# Patient Record
Sex: FEMALE | Race: BLACK OR AFRICAN AMERICAN | NOT HISPANIC OR LATINO | Employment: FULL TIME | URBAN - METROPOLITAN AREA
[De-identification: names, ages, dates, MRNs, and addresses within clinical notes are randomized per-mention and may not be internally consistent; named-entity substitution may affect disease eponyms.]

---

## 2017-08-17 ENCOUNTER — OFFICE VISIT (OUTPATIENT)
Dept: GASTROENTEROLOGY | Facility: CLINIC | Age: 47
End: 2017-08-17

## 2017-08-17 VITALS
DIASTOLIC BLOOD PRESSURE: 78 MMHG | WEIGHT: 154.6 LBS | SYSTOLIC BLOOD PRESSURE: 122 MMHG | HEIGHT: 63 IN | TEMPERATURE: 98.2 F | BODY MASS INDEX: 27.39 KG/M2

## 2017-08-17 DIAGNOSIS — K21.9 GASTROESOPHAGEAL REFLUX DISEASE, ESOPHAGITIS PRESENCE NOT SPECIFIED: Primary | ICD-10-CM

## 2017-08-17 DIAGNOSIS — R09.89 GLOBUS SENSATION: ICD-10-CM

## 2017-08-17 PROCEDURE — 99204 OFFICE O/P NEW MOD 45 MIN: CPT | Performed by: INTERNAL MEDICINE

## 2017-08-17 RX ORDER — OMEPRAZOLE 40 MG/1
40 CAPSULE, DELAYED RELEASE ORAL DAILY
COMMUNITY
End: 2017-08-17 | Stop reason: ALTCHOICE

## 2017-08-17 RX ORDER — SODIUM CHLORIDE 0.9 % (FLUSH) 0.9 %
1-10 SYRINGE (ML) INJECTION AS NEEDED
Status: CANCELLED | OUTPATIENT
Start: 2017-09-19

## 2017-08-17 RX ORDER — ESOMEPRAZOLE MAGNESIUM 40 MG/1
40 CAPSULE, DELAYED RELEASE ORAL DAILY
Qty: 30 CAPSULE | Refills: 3 | Status: SHIPPED | OUTPATIENT
Start: 2017-08-17 | End: 2017-09-21 | Stop reason: SDUPTHER

## 2017-08-17 RX ORDER — SUCRALFATE ORAL 1 G/10ML
1 SUSPENSION ORAL EVERY 6 HOURS PRN
Qty: 420 ML | Refills: 2 | Status: SHIPPED | OUTPATIENT
Start: 2017-08-17 | End: 2018-07-16

## 2017-08-17 RX ORDER — SODIUM CHLORIDE, SODIUM LACTATE, POTASSIUM CHLORIDE, CALCIUM CHLORIDE 600; 310; 30; 20 MG/100ML; MG/100ML; MG/100ML; MG/100ML
30 INJECTION, SOLUTION INTRAVENOUS CONTINUOUS
Status: CANCELLED | OUTPATIENT
Start: 2017-09-19

## 2017-08-17 NOTE — PROGRESS NOTES
Chief Complaint   Patient presents with   • Heartburn   • Nausea       Subjective     HPI    Tika Rodriguez is a 47 y.o. female with a past medical history noted below who presents for evaluation of heartburn and nausea.  She reports symptoms present for about 5 months. Describes a sensation of acidic reflux and needing to swallow a lot to keep the acid down.  She localizes the discomfort to her epigastric region.  She describes it is acid-like and burning in quality.  It is intermittent but occurring on a daily basis.  She also has some globus sensation though she denies dysphagia.  There is some nausea associated with the symptoms though she has no vomiting.  She has been on omeprazole for about 2 months but symptoms persist.  She has had no change in her bowel movements.  There is no abdominal pain.  She has not been on any NSAIDs.    She has had intentional weight loss of about 80 pounds in the past year.She was seen at a weight loss clinic and has been on phentermine intermittently.She is not on this currently.    She doesn't smoke any cigarettes, rare ETOH.  Works at a nursing home.      She denies a family history of colon cancer or colon polyps.  She had never had any endoscopy.     Review of her labs in care everywhere are notable for a normal CBC and CMP.  She also had a normal amylase and lipase.  These were all on June 29, 2017.      Past Medical History:   Diagnosis Date   • GERD (gastroesophageal reflux disease)    • Hypertension          Current Outpatient Prescriptions:   •  hydrochlorothiazide (HYDRODIURIL) 12.5 MG tablet, Take 12.5 mg by mouth 2 (Two) Times a Day., Disp: , Rfl:   •  phentermine 37.5 MG capsule, Take 37.5 mg by mouth Every Morning., Disp: , Rfl:   •  esomeprazole (nexIUM) 40 MG capsule, Take 1 capsule by mouth Daily., Disp: 30 capsule, Rfl: 3  •  sucralfate (CARAFATE) 1 GM/10ML suspension, Take 10 mL by mouth Every 6 (Six) Hours As Needed (heartburn, reflux)., Disp: 420 mL, Rfl:  2    No Known Allergies    Social History     Social History   • Marital status:      Spouse name: N/A   • Number of children: N/A   • Years of education: N/A     Occupational History   • Not on file.     Social History Main Topics   • Smoking status: Never Smoker   • Smokeless tobacco: Never Used   • Alcohol use No   • Drug use: No   • Sexual activity: Not on file     Other Topics Concern   • Not on file     Social History Narrative       History reviewed. No pertinent family history.    Review of Systems   Constitutional: Negative for activity change, appetite change and fatigue.   HENT: Negative for congestion, sore throat and trouble swallowing.    Eyes: Negative.    Respiratory: Negative.    Cardiovascular: Negative.    Gastrointestinal: Positive for abdominal pain and nausea. Negative for abdominal distention and blood in stool.   Endocrine: Negative for cold intolerance and heat intolerance.   Genitourinary: Negative for difficulty urinating, dysuria and frequency.   Musculoskeletal: Negative for arthralgias, back pain and myalgias.   Skin: Negative.    Hematological: Negative for adenopathy. Does not bruise/bleed easily.   All other systems reviewed and are negative.      Objective     Vitals:    08/17/17 1007   BP: 122/78   Temp: 98.2 °F (36.8 °C)     Last 2 weights    08/17/17  1007   Weight: 154 lb 9.6 oz (70.1 kg)     Body mass index is 27.39 kg/(m^2).    Physical Exam   Constitutional: She is oriented to person, place, and time. She appears well-developed and well-nourished. No distress.   HENT:   Head: Normocephalic and atraumatic.   Right Ear: External ear normal.   Left Ear: External ear normal.   Nose: Nose normal.   Mouth/Throat: Oropharynx is clear and moist.   Eyes: Conjunctivae and EOM are normal. Right eye exhibits no discharge. Left eye exhibits no discharge. No scleral icterus.   Neck: Normal range of motion. Neck supple. No thyromegaly present.   No supraclavicular adenopathy    Cardiovascular: Normal rate, regular rhythm, normal heart sounds and intact distal pulses.  Exam reveals no gallop.    No murmur heard.  No lower extremity edema   Pulmonary/Chest: Effort normal and breath sounds normal. No respiratory distress. She has no wheezes.   Abdominal: Soft. Normal appearance and bowel sounds are normal. She exhibits no distension and no mass. There is no hepatosplenomegaly. There is no tenderness. There is no rigidity, no rebound and no guarding.   Genitourinary:   Genitourinary Comments: Rectal exam deferred   Musculoskeletal: Normal range of motion. She exhibits no edema or tenderness.   No atrophy of upper or lower extremities.  Normal digits and nails of both hands.   Lymphadenopathy:     She has no cervical adenopathy.   Neurological: She is alert and oriented to person, place, and time. She displays no atrophy. Coordination normal.   Skin: Skin is warm and dry. No rash noted. She is not diaphoretic. No erythema.   Psychiatric: She has a normal mood and affect. Her behavior is normal. Judgment and thought content normal.   Vitals reviewed.      WBC   Date Value Ref Range Status   11/08/2016 7.03 4.50 - 10.70 10*3/mm3 Final     RBC   Date Value Ref Range Status   11/08/2016 5.28 (H) 3.90 - 5.20 10*6/mm3 Final     Hemoglobin   Date Value Ref Range Status   11/08/2016 13.3 11.9 - 15.5 g/dL Final     Hematocrit   Date Value Ref Range Status   11/08/2016 42.8 35.6 - 45.5 % Final     MCV   Date Value Ref Range Status   11/08/2016 81.1 80.5 - 98.2 fL Final     MCH   Date Value Ref Range Status   11/08/2016 25.2 (L) 26.9 - 32.0 pg Final     MCHC   Date Value Ref Range Status   11/08/2016 31.1 (L) 32.4 - 36.3 g/dL Final     RDW   Date Value Ref Range Status   11/08/2016 16.7 (H) 11.7 - 13.0 % Final     RDW-SD   Date Value Ref Range Status   11/08/2016 50.1 37.0 - 54.0 fl Final     MPV   Date Value Ref Range Status   11/08/2016 11.8 6.0 - 12.0 fL Final     Platelets   Date Value Ref Range  Status   11/08/2016 286 140 - 500 10*3/mm3 Final     Neutrophil %   Date Value Ref Range Status   11/08/2016 57.0 42.7 - 76.0 % Final     Lymphocyte %   Date Value Ref Range Status   11/08/2016 35.0 19.6 - 45.3 % Final     Monocyte %   Date Value Ref Range Status   11/08/2016 2.7 (L) 5.0 - 12.0 % Final     Eosinophil %   Date Value Ref Range Status   11/08/2016 4.7 0.3 - 6.2 % Final     Basophil %   Date Value Ref Range Status   11/08/2016 0.3 0.0 - 1.5 % Final     Immature Grans %   Date Value Ref Range Status   11/08/2016 0.3 0.0 - 0.5 % Final     Neutrophils, Absolute   Date Value Ref Range Status   11/08/2016 4.01 1.90 - 8.10 10*3/mm3 Final     Lymphocytes, Absolute   Date Value Ref Range Status   11/08/2016 2.46 0.90 - 4.80 10*3/mm3 Final     Monocytes, Absolute   Date Value Ref Range Status   11/08/2016 0.19 (L) 0.20 - 1.20 10*3/mm3 Final     Eosinophils, Absolute   Date Value Ref Range Status   11/08/2016 0.33 0.00 - 0.70 10*3/mm3 Final     Basophils, Absolute   Date Value Ref Range Status   11/08/2016 0.02 0.00 - 0.20 10*3/mm3 Final     Immature Grans, Absolute   Date Value Ref Range Status   11/08/2016 0.02 0.00 - 0.03 10*3/mm3 Final     nRBC   Date Value Ref Range Status   11/08/2016 0.0 0.0 - 0.0 /100 WBC Final       Glucose   Date Value Ref Range Status   11/08/2016 90 65 - 99 mg/dL Final     Sodium   Date Value Ref Range Status   11/08/2016 141 136 - 145 mmol/L Final     Potassium   Date Value Ref Range Status   11/08/2016 3.3 (L) 3.5 - 5.2 mmol/L Final     CO2   Date Value Ref Range Status   11/08/2016 27.7 22.0 - 29.0 mmol/L Final     Chloride   Date Value Ref Range Status   11/08/2016 101 98 - 107 mmol/L Final     Anion Gap   Date Value Ref Range Status   11/08/2016 12.3 mmol/L Final     Creatinine   Date Value Ref Range Status   11/08/2016 0.78 0.57 - 1.00 mg/dL Final     BUN   Date Value Ref Range Status   11/08/2016 17 6 - 20 mg/dL Final     BUN/Creatinine Ratio   Date Value Ref Range Status    11/08/2016 21.8 7.0 - 25.0 Final     Calcium   Date Value Ref Range Status   11/08/2016 10.1 8.6 - 10.5 mg/dL Final     Alkaline Phosphatase   Date Value Ref Range Status   11/08/2016 54 39 - 117 U/L Final     Total Protein   Date Value Ref Range Status   11/08/2016 7.4 6.0 - 8.5 g/dL Final     ALT (SGPT)   Date Value Ref Range Status   11/08/2016 12 1 - 33 U/L Final     AST (SGOT)   Date Value Ref Range Status   11/08/2016 12 1 - 32 U/L Final     Total Bilirubin   Date Value Ref Range Status   11/08/2016 0.3 0.1 - 1.2 mg/dL Final     Albumin   Date Value Ref Range Status   11/08/2016 4.20 3.50 - 5.20 g/dL Final     Globulin   Date Value Ref Range Status   11/08/2016 3.2 gm/dL Final     A/G Ratio   Date Value Ref Range Status   11/08/2016 1.3 g/dL Final         Imaging Results (last 7 days)     ** No results found for the last 168 hours. **            No notes on file    Assessment/Plan    1. GERD: persistent despite omeprazole.  ? Hiatal hernia, esophagitis  2. Globus sensation: associated with above     Plan  -change omep to nexium-- samples given  -add as needed carafate  -further evaluation with EGD-- we discussed the procedure, risks and benefits and she understands and agrees to proceed    Tika was seen today for heartburn and nausea.    Diagnoses and all orders for this visit:    Gastroesophageal reflux disease, esophagitis presence not specified  -     Case Request; Standing  -     sodium chloride 0.9 % flush 1-10 mL; Infuse 1-10 mL into a venous catheter As Needed for Line Care.  -     lactated ringers infusion; Infuse 30 mL/hr into a venous catheter Continuous.  -     Case Request  -     esomeprazole (nexIUM) 40 MG capsule; Take 1 capsule by mouth Daily.  -     sucralfate (CARAFATE) 1 GM/10ML suspension; Take 10 mL by mouth Every 6 (Six) Hours As Needed (heartburn, reflux).    Globus sensation  -     Case Request; Standing  -     sodium chloride 0.9 % flush 1-10 mL; Infuse 1-10 mL into a venous  catheter As Needed for Line Care.  -     lactated ringers infusion; Infuse 30 mL/hr into a venous catheter Continuous.  -     Case Request  -     esomeprazole (nexIUM) 40 MG capsule; Take 1 capsule by mouth Daily.  -     sucralfate (CARAFATE) 1 GM/10ML suspension; Take 10 mL by mouth Every 6 (Six) Hours As Needed (heartburn, reflux).    Other orders  -     Implement Anesthesia Orders Day of Procedure; Standing  -     Obtain Informed Consent; Standing  -     Insert Peripheral IV; Standing  -     Saline Lock & Maintain IV Access; Standing        I have discussed the above plan with the patient.  They verbalize understanding and are in agreement with the plan.  They have been advised to contact the office for any questions, concerns, or changes related to their health.    Dictated utilizing Dragon dictation

## 2017-08-17 NOTE — PATIENT INSTRUCTIONS
We will work on the authorization for nexium for you. This will replace the prilosec    Start the carafate    Schedule the EGD    For any additional questions, concerns or changes to your condition after today's office visit please contact the office at [phone].

## 2017-08-21 ENCOUNTER — DOCUMENTATION (OUTPATIENT)
Dept: GASTROENTEROLOGY | Facility: CLINIC | Age: 47
End: 2017-08-21

## 2017-08-21 NOTE — PROGRESS NOTES
[facility]  [facility] has been approved.

## 2017-09-19 ENCOUNTER — ANESTHESIA (OUTPATIENT)
Dept: GASTROENTEROLOGY | Facility: HOSPITAL | Age: 47
End: 2017-09-19

## 2017-09-19 ENCOUNTER — HOSPITAL ENCOUNTER (OUTPATIENT)
Facility: HOSPITAL | Age: 47
Setting detail: HOSPITAL OUTPATIENT SURGERY
Discharge: HOME | End: 2017-09-19
Attending: INTERNAL MEDICINE | Admitting: INTERNAL MEDICINE

## 2017-09-19 ENCOUNTER — ANESTHESIA EVENT (OUTPATIENT)
Dept: GASTROENTEROLOGY | Facility: HOSPITAL | Age: 47
End: 2017-09-19

## 2017-09-19 VITALS
SYSTOLIC BLOOD PRESSURE: 120 MMHG | RESPIRATION RATE: 16 BRPM | BODY MASS INDEX: 26.22 KG/M2 | OXYGEN SATURATION: 100 % | DIASTOLIC BLOOD PRESSURE: 84 MMHG | WEIGHT: 148 LBS | TEMPERATURE: 97.8 F | HEIGHT: 63 IN | HEART RATE: 77 BPM

## 2017-09-19 DIAGNOSIS — R09.89 GLOBUS SENSATION: ICD-10-CM

## 2017-09-19 DIAGNOSIS — K21.9 GASTROESOPHAGEAL REFLUX DISEASE, ESOPHAGITIS PRESENCE NOT SPECIFIED: ICD-10-CM

## 2017-09-19 PROCEDURE — 88305 TISSUE EXAM BY PATHOLOGIST: CPT | Performed by: INTERNAL MEDICINE

## 2017-09-19 PROCEDURE — 25010000002 PROPOFOL 10 MG/ML EMULSION: Performed by: ANESTHESIOLOGY

## 2017-09-19 PROCEDURE — 43239 EGD BIOPSY SINGLE/MULTIPLE: CPT | Performed by: INTERNAL MEDICINE

## 2017-09-19 PROCEDURE — 88312 SPECIAL STAINS GROUP 1: CPT | Performed by: INTERNAL MEDICINE

## 2017-09-19 RX ORDER — SODIUM CHLORIDE, SODIUM LACTATE, POTASSIUM CHLORIDE, CALCIUM CHLORIDE 600; 310; 30; 20 MG/100ML; MG/100ML; MG/100ML; MG/100ML
30 INJECTION, SOLUTION INTRAVENOUS CONTINUOUS
Status: DISCONTINUED | OUTPATIENT
Start: 2017-09-19 | End: 2017-09-19 | Stop reason: HOSPADM

## 2017-09-19 RX ORDER — HYDROXYZINE HYDROCHLORIDE 25 MG/1
25 TABLET, FILM COATED ORAL 3 TIMES DAILY PRN
COMMUNITY
End: 2018-07-16

## 2017-09-19 RX ORDER — LIDOCAINE HYDROCHLORIDE 20 MG/ML
INJECTION, SOLUTION INFILTRATION; PERINEURAL AS NEEDED
Status: DISCONTINUED | OUTPATIENT
Start: 2017-09-19 | End: 2017-09-19 | Stop reason: SURG

## 2017-09-19 RX ORDER — PROPOFOL 10 MG/ML
VIAL (ML) INTRAVENOUS AS NEEDED
Status: DISCONTINUED | OUTPATIENT
Start: 2017-09-19 | End: 2017-09-19 | Stop reason: SURG

## 2017-09-19 RX ORDER — PROPOFOL 10 MG/ML
VIAL (ML) INTRAVENOUS CONTINUOUS PRN
Status: DISCONTINUED | OUTPATIENT
Start: 2017-09-19 | End: 2017-09-19 | Stop reason: SURG

## 2017-09-19 RX ADMIN — PROPOFOL 200 MG: 10 INJECTION, EMULSION INTRAVENOUS at 10:14

## 2017-09-19 RX ADMIN — SODIUM CHLORIDE, POTASSIUM CHLORIDE, SODIUM LACTATE AND CALCIUM CHLORIDE 30 ML/HR: 600; 310; 30; 20 INJECTION, SOLUTION INTRAVENOUS at 09:50

## 2017-09-19 RX ADMIN — LIDOCAINE HYDROCHLORIDE 80 MG: 20 INJECTION, SOLUTION INFILTRATION; PERINEURAL at 10:14

## 2017-09-19 RX ADMIN — PROPOFOL 140 MCG/KG/MIN: 10 INJECTION, EMULSION INTRAVENOUS at 10:14

## 2017-09-19 NOTE — ANESTHESIA POSTPROCEDURE EVALUATION
Patient: Tika Rodriguez    Procedure Summary     Date Anesthesia Start Anesthesia Stop Room / Location    09/19/17 1009 1038  [facility]       Procedure Diagnosis Surgeon Provider    ESOPHAGOGASTRODUODENOSCOPY WITH COLD BIOPSIES (N/A Esophagus) Globus sensation; Gastroesophageal reflux disease, esophagitis presence not specified  (Globus sensation [F45.8]; Gastroesophageal reflux disease, esophagitis presence not specified [K21.9]) MD Raz Ashford MD          Anesthesia Type: MAC  Last vitals  BP   99/61 (09/19/17 1052)    Temp   36.6 °C (97.8 °F) (09/19/17 1032)    Pulse   77 (09/19/17 1052)   Resp   16 (09/19/17 1052)    SpO2   100 % (09/19/17 1052)      Post Anesthesia Care and Evaluation    Patient location during evaluation: PACU  Patient participation: complete - patient participated  Level of consciousness: awake  Pain score: 0  Pain management: adequate  Airway patency: patent  Anesthetic complications: No anesthetic complications    Cardiovascular status: acceptable  Respiratory status: acceptable  Hydration status: acceptable  No anesthesia care post op

## 2017-09-19 NOTE — ANESTHESIA PREPROCEDURE EVALUATION
Anesthesia Evaluation     Patient summary reviewed   no history of anesthetic complications:  NPO Solid Status: > 8 hours  NPO Liquid Status: > 8 hours     Airway   Mallampati: I  TM distance: >3 FB  Neck ROM: full  no difficulty expected  Dental      Pulmonary     breath sounds clear to auscultation  (-) shortness of breath, not a smoker  Cardiovascular   Exercise tolerance: good (4-7 METS)    Rhythm: regular  Rate: normal    (+) hypertension,   (-) angina, [place]      Neuro/Psych  GI/Hepatic/Renal/Endo    (+)  GERD,     Musculoskeletal     Abdominal    Substance History      OB/GYN          Other                                      Anesthesia Plan    ASA 2     MAC   total IV anesthesia  intravenous induction   Anesthetic plan and risks discussed with patient.

## 2017-09-19 NOTE — PLAN OF CARE
Problem: Patient Care Overview (Adult)  Goal: Plan of Care Review  Outcome: Ongoing (interventions implemented as appropriate)    09/19/17 0912   Coping/Psychosocial Response Interventions   Plan Of Care Reviewed With patient       Goal: Adult Individualization and Mutuality  Outcome: Ongoing (interventions implemented as appropriate)    09/19/17 0912   Individualization   Patient Specific Preferences none       Goal: Discharge Needs Assessment  Outcome: Ongoing (interventions implemented as appropriate)    09/19/17 0912   Discharge Needs Assessment   Concerns To Be Addressed no discharge needs identified   Readmission Within The Last 30 Days no previous admission in last 30 days   Equipment Needed After Discharge none   Current Health   Anticipated Changes Related to Illness none   Living Environment   Transportation Available car;family or friend will provide         Problem: GI Endoscopy (Adult)  Goal: Signs and Symptoms of Listed Potential Problems Will be Absent or Manageable (GI Endoscopy)  Outcome: Ongoing (interventions implemented as appropriate)    09/19/17 0912   GI Endoscopy   Problems Assessed (GI Endoscopy) bleeding;pain

## 2017-09-19 NOTE — H&P
[facility]  Pre Procedure History & Physical    Chief Complaint: GERD, globus      HPI: 47 y.o. female with a past medical history noted below who presents for evaluation of heartburn and nausea.  She reports symptoms present for about 5 months. Describes a sensation of acidic reflux and needing to swallow a lot to keep the acid down.  She localizes the discomfort to her epigastric region.  She describes it is acid-like and burning in quality.  It is intermittent but occurring on a daily basis.  She also has some globus sensation though she denies dysphagia.  There is some nausea associated with the symptoms though she has no vomiting.  She has been on omeprazole for about 2 months but symptoms persist.  She has had no change in her bowel movements.  There is no abdominal pain.  She has not been on any NSAIDs.     She has had intentional weight loss of about 80 pounds in the past year.She was seen at a weight loss clinic and has been on phentermine intermittently.She is not on this currently.     She doesn't smoke any cigarettes, rare ETOH.  Works at a nursing home.       She denies a family history of colon cancer or colon polyps.  She had never had any endoscopy.      Review of her labs in care everywhere are notable for a normal CBC and CMP.  She also had a normal amylase and lipase.  These were all on June 29, 2017.    Past Medical History:   Past Medical History:   Diagnosis Date   • GERD (gastroesophageal reflux disease)    • Hypertension        Family History:  History reviewed. No pertinent family history.    Social History:   reports that she has never smoked. She has never used smokeless tobacco. She reports that she does not drink alcohol or use illicit drugs.    Medications:   Prescriptions Prior to Admission   Medication Sig Dispense Refill Last Dose   • esomeprazole (nexIUM) 40 MG capsule Take 1 capsule by mouth Daily. 30 capsule 3    • hydrochlorothiazide (HYDRODIURIL) 12.5 MG  "tablet Take 12.5 mg by mouth 2 (Two) Times a Day.   Taking   • phentermine 37.5 MG capsule Take 37.5 mg by mouth Every Morning.   Taking   • sucralfate (CARAFATE) 1 GM/10ML suspension Take 10 mL by mouth Every 6 (Six) Hours As Needed (heartburn, reflux). 420 mL 2        Allergies:  Review of patient's allergies indicates no known allergies.    ROS:    Pertinent items are noted in HPI     Objective     Blood pressure 123/73, pulse 73, temperature 97.7 °F (36.5 °C), temperature source Oral, resp. rate 12, height 63\" (160 cm), weight 148 lb (67.1 kg), SpO2 99 %.    Physical Exam   Constitutional: Pt is oriented to person, place, and time and well-developed, well-nourished, and in no distress.   HENT:   Mouth/Throat: Oropharynx is clear and moist.   Neck: Normal range of motion. Neck supple.   Cardiovascular: Normal rate, regular rhythm and normal heart sounds.    Pulmonary/Chest: Effort normal and breath sounds normal. No respiratory distress. No  wheezes.   Abdominal: Soft. Bowel sounds are normal.   Skin: Skin is warm and dry.   Psychiatric: Mood, memory, affect and judgment normal.     Assessment/Plan     Diagnosis:GERD, globus      Anticipated Surgical Procedure:  EGD      The risks, benefits, and alternatives of this procedure have been discussed with the patient or the responsible party- the patient understands and agrees to proceed.  "

## 2017-09-20 LAB
CYTO UR: NORMAL
LAB AP CASE REPORT: NORMAL
Lab: NORMAL
PATH REPORT.FINAL DX SPEC: NORMAL
PATH REPORT.GROSS SPEC: NORMAL

## 2017-09-21 DIAGNOSIS — A04.8 BACTERIAL INFECTION DUE TO HELICOBACTER PYLORI: Primary | ICD-10-CM

## 2017-09-21 DIAGNOSIS — K21.9 GASTROESOPHAGEAL REFLUX DISEASE, ESOPHAGITIS PRESENCE NOT SPECIFIED: ICD-10-CM

## 2017-09-21 DIAGNOSIS — R09.89 GLOBUS SENSATION: ICD-10-CM

## 2017-09-21 RX ORDER — ESOMEPRAZOLE MAGNESIUM 40 MG/1
40 CAPSULE, DELAYED RELEASE ORAL 2 TIMES DAILY WITH MEALS
Qty: 30 CAPSULE | Refills: 3 | Status: SHIPPED | OUTPATIENT
Start: 2017-09-21 | End: 2017-10-05

## 2017-09-21 RX ORDER — TETRACYCLINE HYDROCHLORIDE 500 MG/1
500 CAPSULE ORAL 4 TIMES DAILY
Qty: 56 CAPSULE | Refills: 0 | Status: SHIPPED | OUTPATIENT
Start: 2017-09-21 | End: 2017-11-17

## 2017-09-21 RX ORDER — METRONIDAZOLE 250 MG/1
250 TABLET ORAL 4 TIMES DAILY
Qty: 56 TABLET | Refills: 0 | Status: SHIPPED | OUTPATIENT
Start: 2017-09-21 | End: 2017-11-17

## 2017-09-21 RX ORDER — BISMUTH SUBSALICYLATE 262 MG
524 TABLET,CHEWABLE ORAL 4 TIMES DAILY
Qty: 112 TABLET | Refills: 0 | Status: SHIPPED | OUTPATIENT
Start: 2017-09-21 | End: 2018-07-16

## 2017-09-21 NOTE — PROGRESS NOTES
Her EGD was positive for H. pylori.  The rest of the exam was unremarkable.  I have ordered quadruple therapy for her.  She should increase the Nexium to twice daily for 2 weeks while she is on therapy.  Additionally she will need a breath test for cure which I have ordered for her to come get early November.

## 2017-09-25 ENCOUNTER — TELEPHONE (OUTPATIENT)
Dept: GASTROENTEROLOGY | Facility: CLINIC | Age: 47
End: 2017-09-25

## 2017-09-25 ENCOUNTER — DOCUMENTATION (OUTPATIENT)
Dept: GASTROENTEROLOGY | Facility: CLINIC | Age: 47
End: 2017-09-25

## 2017-09-25 NOTE — PROGRESS NOTES
PA submitted for Esomeprazole 40 MG BID for 14 days through [facility]

## 2017-09-25 NOTE — TELEPHONE ENCOUNTER
----- Message from Shadia Ruano MD sent at 9/21/2017  2:39 PM EDT -----  Her EGD was positive for H. pylori.  The rest of the exam was unremarkable.  I have ordered quadruple therapy for her.  She should increase the Nexium to twice daily for 2 weeks while she is on therapy.  Additionally she will need a breath test for cure which I have ordered for her to come get early November.

## 2017-09-26 ENCOUNTER — DOCUMENTATION (OUTPATIENT)
Dept: GASTROENTEROLOGY | Facility: CLINIC | Age: 47
End: 2017-09-26

## 2017-09-28 NOTE — TELEPHONE ENCOUNTER
Call to pt.  Advise per Dr Ruano that EGD was positive for H pylori.  The rest of the exam was unremarkable.  Dr Ruano has ordered quadruple tx.  Increase the Nexium to twice daily for 2 weeks while on tx.  Additionally will need a breath test for cure - come back in early November.    Pt verb understanding.  Lab appt scheduled for 11/9/17 @ 0800.

## 2017-10-03 ENCOUNTER — TELEPHONE (OUTPATIENT)
Dept: GASTROENTEROLOGY | Facility: CLINIC | Age: 47
End: 2017-10-03

## 2017-10-03 DIAGNOSIS — R11.0 NAUSEA: Primary | ICD-10-CM

## 2017-10-03 RX ORDER — ONDANSETRON 4 MG/1
4 TABLET, FILM COATED ORAL EVERY 8 HOURS PRN
Qty: 25 TABLET | Refills: 0 | COMMUNITY
End: 2017-10-04 | Stop reason: SDUPTHER

## 2017-10-03 NOTE — TELEPHONE ENCOUNTER
Called pt and pt reports that the meds for the h pylori are upsetting her stomach. She states she has been taking them for approx one and still has a week to go.  She states she is taking the nexium but she is still staying nauseated.  Advised would send  Message to Dr Ruano.  Pt verb understanding.

## 2017-10-04 RX ORDER — ONDANSETRON 4 MG/1
4 TABLET, FILM COATED ORAL EVERY 8 HOURS PRN
Qty: 25 TABLET | Refills: 0 | Status: SHIPPED | OUTPATIENT
Start: 2017-10-04 | End: 2017-11-17

## 2017-10-04 NOTE — TELEPHONE ENCOUNTER
Called pt and advised per Dr Ruano that she has ordered zofran for her, but when check the medications ordered did not see it..      After checking , zofran was ordered, but script did not go thru.  Zofran reordered and receipt confirmed by pharmacy.  Pt called and notified.

## 2017-11-13 LAB — UREA BREATH TEST QL: NEGATIVE

## 2017-11-17 ENCOUNTER — OFFICE VISIT (OUTPATIENT)
Dept: GASTROENTEROLOGY | Facility: CLINIC | Age: 47
End: 2017-11-17

## 2017-11-17 VITALS
SYSTOLIC BLOOD PRESSURE: 108 MMHG | TEMPERATURE: 97.9 F | BODY MASS INDEX: 29.16 KG/M2 | WEIGHT: 164.6 LBS | HEIGHT: 63 IN | DIASTOLIC BLOOD PRESSURE: 80 MMHG

## 2017-11-17 DIAGNOSIS — R09.89 GLOBUS SENSATION: ICD-10-CM

## 2017-11-17 DIAGNOSIS — B96.81 HELICOBACTER PYLORI GASTRITIS: ICD-10-CM

## 2017-11-17 DIAGNOSIS — K29.70 HELICOBACTER PYLORI GASTRITIS: ICD-10-CM

## 2017-11-17 DIAGNOSIS — R11.0 NAUSEA: ICD-10-CM

## 2017-11-17 DIAGNOSIS — K21.9 GASTROESOPHAGEAL REFLUX DISEASE WITHOUT ESOPHAGITIS: Primary | ICD-10-CM

## 2017-11-17 PROCEDURE — 99213 OFFICE O/P EST LOW 20 MIN: CPT | Performed by: INTERNAL MEDICINE

## 2017-11-17 RX ORDER — METHOCARBAMOL 500 MG/1
TABLET, FILM COATED ORAL
Refills: 0 | COMMUNITY
Start: 2017-11-01

## 2017-11-17 RX ORDER — ESOMEPRAZOLE MAGNESIUM 40 MG/1
CAPSULE, DELAYED RELEASE ORAL
Refills: 0 | COMMUNITY
Start: 2017-10-31 | End: 2018-03-13 | Stop reason: SDUPTHER

## 2017-11-17 RX ORDER — IBUPROFEN 800 MG/1
TABLET ORAL
Refills: 0 | COMMUNITY
Start: 2017-11-01 | End: 2018-07-16

## 2017-11-17 NOTE — PATIENT INSTRUCTIONS
Continue the nexium every day until the end of December.  Then take it ever other day starting in January for 3 weeks and then stop. We will meet up in February to see how youre doing.      For any additional questions, concerns or changes to your condition after today's office visit please contact the office at [phone].

## 2017-11-17 NOTE — PROGRESS NOTES
Chief Complaint   Patient presents with   • Heartburn     Subjective     HPI  Tika Rodriguez is a 47 y.o. female who presents for follow up of GERD with globus sensation and nausea.  EGD 9/19/17 with gastritis, no narrowing of the esophagus and no eosinophilic esophagitis, stomach biopsies came back with H pylori gastritis.  She completed quadruple therapy and had her breath test 11/9/17 that showed she had cleared the infection.  She is currently on daily Nexium.    She has been feeling better.  GERD symptoms have improved.  She is no longer having any nausea symptoms.  Her feelings of globus have resolved. BMs normal, eating and drinking ok.  She has been eating more and has gained weight over the past 2 months.      Past Medical History:   Diagnosis Date   • GERD (gastroesophageal reflux disease)    • Hypertension        Social History     Social History   • Marital status:      Spouse name: N/A   • Number of children: N/A   • Years of education: N/A     Social History Main Topics   • Smoking status: Never Smoker   • Smokeless tobacco: Never Used   • Alcohol use No   • Drug use: No   • Sexual activity: Not Asked     Other Topics Concern   • None     Social History Narrative         Current Outpatient Prescriptions:   •  Bismuth 262 MG chewable tablet, Chew 2 tablets 4 (Four) Times a Day., Disp: 112 tablet, Rfl: 0  •  esomeprazole (nexIUM) 40 MG capsule, , Disp: , Rfl: 0  •  hydrochlorothiazide (HYDRODIURIL) 12.5 MG tablet, Take 12.5 mg by mouth 2 (Two) Times a Day., Disp: , Rfl:   •  hydrOXYzine (ATARAX) 25 MG tablet, Take 25 mg by mouth 3 (Three) Times a Day As Needed for Itching., Disp: , Rfl:   •  ibuprofen (ADVIL,MOTRIN) 800 MG tablet, TK 1 T PO  TID WF PRN P, Disp: , Rfl: 0  •  methocarbamol (ROBAXIN) 500 MG tablet, TK 1 T PO  QID PRF MUSCLE SPASMS, Disp: , Rfl: 0  •  phentermine 37.5 MG capsule, Take 37.5 mg by mouth Every Morning., Disp: , Rfl:   •  sucralfate (CARAFATE) 1 GM/10ML suspension, Take 10  mL by mouth Every 6 (Six) Hours As Needed (heartburn, reflux)., Disp: 420 mL, Rfl: 2    Review of Systems   Constitutional: Positive for unexpected weight change. Negative for activity change, appetite change and fatigue.   HENT: Negative for sore throat and trouble swallowing.    Respiratory: Negative.    Cardiovascular: Negative.    Gastrointestinal: Negative for abdominal distention, abdominal pain, blood in stool and nausea.   Endocrine: Negative for cold intolerance and heat intolerance.   Genitourinary: Negative for difficulty urinating, dysuria and frequency.   Musculoskeletal: Negative for arthralgias, back pain and myalgias.   Hematological: Negative for adenopathy. Does not bruise/bleed easily.   All other systems reviewed and are negative.      Objective   Vitals:    11/17/17 0940   BP: 108/80   Temp: 97.9 °F (36.6 °C)     Last Weight    11/17/17  0940   Weight: 164 lb 9.6 oz (74.7 kg)     Body mass index is 29.16 kg/(m^2).      Physical Exam   Constitutional: She is oriented to person, place, and time. She appears well-developed and well-nourished. No distress.   HENT:   Head: Normocephalic and atraumatic.   Right Ear: External ear normal.   Left Ear: External ear normal.   Nose: Nose normal.   Eyes: Conjunctivae and EOM are normal. No scleral icterus.   Cardiovascular: Normal rate, regular rhythm, normal heart sounds and intact distal pulses.  Exam reveals no gallop.    No murmur heard.  No lower extremity edema   Pulmonary/Chest: Effort normal and breath sounds normal. No respiratory distress. She has no wheezes.   Abdominal: Soft. Normal appearance and bowel sounds are normal. She exhibits no distension and no mass. There is no hepatosplenomegaly. There is no tenderness. There is no rigidity, no rebound and no guarding.   Genitourinary:   Genitourinary Comments: Rectal exam deferred   Musculoskeletal: Normal range of motion. She exhibits no edema or tenderness.   Normal digits and nails of both hands.   No atrophy or wasting of upper or lower extremeties   Neurological: She is alert and oriented to person, place, and time. She displays no atrophy. Coordination normal.   Skin: Skin is warm and dry. No rash noted. She is not diaphoretic. No erythema.   Psychiatric: She has a normal mood and affect. Her behavior is normal. Judgment and thought content normal.   Vitals reviewed.      WBC   Date Value Ref Range Status   11/08/2016 7.03 4.50 - 10.70 10*3/mm3 Final     RBC   Date Value Ref Range Status   11/08/2016 5.28 (H) 3.90 - 5.20 10*6/mm3 Final     Hemoglobin   Date Value Ref Range Status   11/08/2016 13.3 11.9 - 15.5 g/dL Final     Hematocrit   Date Value Ref Range Status   11/08/2016 42.8 35.6 - 45.5 % Final     MCV   Date Value Ref Range Status   11/08/2016 81.1 80.5 - 98.2 fL Final     MCH   Date Value Ref Range Status   11/08/2016 25.2 (L) 26.9 - 32.0 pg Final     MCHC   Date Value Ref Range Status   11/08/2016 31.1 (L) 32.4 - 36.3 g/dL Final     RDW   Date Value Ref Range Status   11/08/2016 16.7 (H) 11.7 - 13.0 % Final     RDW-SD   Date Value Ref Range Status   11/08/2016 50.1 37.0 - 54.0 fl Final     MPV   Date Value Ref Range Status   11/08/2016 11.8 6.0 - 12.0 fL Final     Platelets   Date Value Ref Range Status   11/08/2016 286 140 - 500 10*3/mm3 Final     Neutrophil %   Date Value Ref Range Status   11/08/2016 57.0 42.7 - 76.0 % Final     Lymphocyte %   Date Value Ref Range Status   11/08/2016 35.0 19.6 - 45.3 % Final     Monocyte %   Date Value Ref Range Status   11/08/2016 2.7 (L) 5.0 - 12.0 % Final     Eosinophil %   Date Value Ref Range Status   11/08/2016 4.7 0.3 - 6.2 % Final     Basophil %   Date Value Ref Range Status   11/08/2016 0.3 0.0 - 1.5 % Final     Immature Grans %   Date Value Ref Range Status   11/08/2016 0.3 0.0 - 0.5 % Final     Neutrophils, Absolute   Date Value Ref Range Status   11/08/2016 4.01 1.90 - 8.10 10*3/mm3 Final     Lymphocytes, Absolute   Date Value Ref Range Status    11/08/2016 2.46 0.90 - 4.80 10*3/mm3 Final     Monocytes, Absolute   Date Value Ref Range Status   11/08/2016 0.19 (L) 0.20 - 1.20 10*3/mm3 Final     Eosinophils, Absolute   Date Value Ref Range Status   11/08/2016 0.33 0.00 - 0.70 10*3/mm3 Final     Basophils, Absolute   Date Value Ref Range Status   11/08/2016 0.02 0.00 - 0.20 10*3/mm3 Final     Immature Grans, Absolute   Date Value Ref Range Status   11/08/2016 0.02 0.00 - 0.03 10*3/mm3 Final     nRBC   Date Value Ref Range Status   11/08/2016 0.0 0.0 - 0.0 /100 WBC Final       Lab Results   Component Value Date    GLUCOSE 90 11/08/2016    BUN 17 11/08/2016    CREATININE 0.78 11/08/2016    EGFRIFAFRI 97 11/08/2016    BCR 21.8 11/08/2016    CO2 27.7 11/08/2016    CALCIUM 10.1 11/08/2016    ALBUMIN 4.20 11/08/2016    LABIL2 1.3 11/08/2016    AST 12 11/08/2016    ALT 12 11/08/2016         Imaging Results (last 7 days)     ** No results found for the last 168 hours. **            Assessment/Plan    1. GERD: stable on nexium    2. H pylori gastritis: She completed quadruple therapy and her breath test showed clearance of the infection    3.  Nausea; resolved with treatment of above and Nexium therapy    4. Globus sensation: ? Due to reflux, resolved on nexium    Plan  We discussed that she is to continue daily Nexium until the end of December.  At that point she will then wean this by taking it every other day for 3 weeks.  After 3 weeks she will stop.  I will then have her follow-up with me in office in February to see how she is doing.  We can consider using an as needed H2 blocker if she is otherwise doing well at that time.    Tika was seen today for heartburn.    Diagnoses and all orders for this visit:    Gastroesophageal reflux disease without esophagitis    Helicobacter pylori gastritis    Nausea    Globus sensation        Dictated utilizing Dragon dictation

## 2017-12-01 ENCOUNTER — TELEPHONE (OUTPATIENT)
Dept: GASTROENTEROLOGY | Facility: CLINIC | Age: 47
End: 2017-12-01

## 2017-12-01 NOTE — TELEPHONE ENCOUNTER
----- Message from Shadia Ruano MD sent at 11/13/2017  1:03 PM EST -----  She has cleared her H pylori infection

## 2018-02-12 ENCOUNTER — OFFICE VISIT (OUTPATIENT)
Dept: GASTROENTEROLOGY | Facility: CLINIC | Age: 48
End: 2018-02-12

## 2018-02-12 VITALS
WEIGHT: 171.8 LBS | HEIGHT: 63 IN | TEMPERATURE: 97.6 F | DIASTOLIC BLOOD PRESSURE: 78 MMHG | SYSTOLIC BLOOD PRESSURE: 110 MMHG | BODY MASS INDEX: 30.44 KG/M2

## 2018-02-12 DIAGNOSIS — R09.89 GLOBUS SENSATION: ICD-10-CM

## 2018-02-12 DIAGNOSIS — K21.9 GASTROESOPHAGEAL REFLUX DISEASE WITHOUT ESOPHAGITIS: ICD-10-CM

## 2018-02-12 DIAGNOSIS — A04.8 BACTERIAL INFECTION DUE TO HELICOBACTER PYLORI: Primary | ICD-10-CM

## 2018-02-12 PROCEDURE — 99214 OFFICE O/P EST MOD 30 MIN: CPT | Performed by: INTERNAL MEDICINE

## 2018-02-12 RX ORDER — FAMOTIDINE 20 MG/1
20 TABLET, FILM COATED ORAL
Qty: 60 TABLET | Refills: 3 | Status: SHIPPED | OUTPATIENT
Start: 2018-02-12 | End: 2018-07-16

## 2018-02-12 NOTE — PATIENT INSTRUCTIONS
Start pepcid twice a day    No nexium, pantoprazole, prilosec, prevacid for the next 2 weeks    Back in 2 weeks for H pylori testing    For any additional questions, concerns or changes to your condition after today's office visit please contact the office at [phone].

## 2018-02-12 NOTE — PROGRESS NOTES
Chief Complaint   Patient presents with   • Heartburn     Subjective     HPI  Tika Rodriguez is a 47 y.o. female who presents for follow up of GERD, globus sensation and H. pylori gastritis.  The H. pylori was discovered on her September EGD.  She completed quadruple therapy and cleared the infection as confirmed by November breath test.  Her GERD symptoms and globus symptoms had been controlled on daily Nexium.    Today in follow-up, she reports that the globus symptoms have returned.  She ran out of Nexium for at least the past week.  She has been taking her brothers pantoprazole but only intermittently.  She reports that her last pill was about 3 days ago.  She describes a sensation of feeling something stuck in her throat.  Her bowel movements are normal.  Her weight is increasing.  She is only taking intermittent Aleve and ibuprofen.    She is requesting repeat H. pylori testing.  We discussed this.  She says that when she had her November breath test that she was still taking the Nexium twice a day.  Unfortunately there is a high probability that this could've been a false negative test.      Past Medical History:   Diagnosis Date   • GERD (gastroesophageal reflux disease)    • Hypertension        Social History     Social History   • Marital status:      Spouse name: N/A   • Number of children: N/A   • Years of education: N/A     Social History Main Topics   • Smoking status: Never Smoker   • Smokeless tobacco: Never Used   • Alcohol use No   • Drug use: No   • Sexual activity: Not Asked     Other Topics Concern   • None     Social History Narrative         Current Outpatient Prescriptions:   •  Bismuth 262 MG chewable tablet, Chew 2 tablets 4 (Four) Times a Day., Disp: 112 tablet, Rfl: 0  •  esomeprazole (nexIUM) 40 MG capsule, , Disp: , Rfl: 0  •  GABAPENTIN PO, Take  by mouth., Disp: , Rfl:   •  hydrochlorothiazide (HYDRODIURIL) 12.5 MG tablet, Take 12.5 mg by mouth 2 (Two) Times a Day., Disp: , Rfl:    •  methocarbamol (ROBAXIN) 500 MG tablet, TK 1 T PO  QID PRF MUSCLE SPASMS, Disp: , Rfl: 0  •  NAPROXEN PO, Take  by mouth., Disp: , Rfl:   •  phentermine 37.5 MG capsule, Take 37.5 mg by mouth Every Morning., Disp: , Rfl:   •  sucralfate (CARAFATE) 1 GM/10ML suspension, Take 10 mL by mouth Every 6 (Six) Hours As Needed (heartburn, reflux)., Disp: 420 mL, Rfl: 2  •  famotidine (PEPCID) 20 MG tablet, Take 1 tablet by mouth 2 (Two) Times a Day Before Meals., Disp: 60 tablet, Rfl: 3  •  hydrOXYzine (ATARAX) 25 MG tablet, Take 25 mg by mouth 3 (Three) Times a Day As Needed for Itching., Disp: , Rfl:   •  ibuprofen (ADVIL,MOTRIN) 800 MG tablet, TK 1 T PO  TID WF PRN P, Disp: , Rfl: 0    Review of Systems   Constitutional: Negative for activity change, appetite change and fatigue.   HENT: Negative for sore throat and trouble swallowing.    Gastrointestinal: Negative for abdominal distention, abdominal pain and blood in stool.        +globus   Endocrine: Negative for cold intolerance and heat intolerance.   Genitourinary: Negative for difficulty urinating, dysuria and frequency.   Musculoskeletal: Negative for arthralgias, back pain and myalgias.   Hematological: Negative for adenopathy. Does not bruise/bleed easily.   All other systems reviewed and are negative.      Objective   Vitals:    02/12/18 0832   BP: 110/78   Temp: 97.6 °F (36.4 °C)     Last Weight    02/12/18  0832   Weight: 77.9 kg (171 lb 12.8 oz)     Body mass index is 30.43 kg/(m^2).      Physical Exam   Constitutional: She is oriented to person, place, and time. She appears well-developed and well-nourished. No distress.   HENT:   Head: Normocephalic and atraumatic.   Right Ear: External ear normal.   Left Ear: External ear normal.   Nose: Nose normal.   Mouth/Throat: Oropharynx is clear and moist.   Eyes: Conjunctivae and EOM are normal. Right eye exhibits no discharge. Left eye exhibits no discharge. No scleral icterus.   Neck: Normal range of motion.  Neck supple. No thyromegaly present.   No supraclavicular adenopathy   Cardiovascular: Normal rate, regular rhythm, normal heart sounds and intact distal pulses.  Exam reveals no gallop.    No murmur heard.  No lower extremity edema   Pulmonary/Chest: Effort normal and breath sounds normal. No respiratory distress. She has no wheezes.   Abdominal: Soft. Normal appearance and bowel sounds are normal. She exhibits no distension and no mass. There is no hepatosplenomegaly. There is tenderness. There is no rigidity, no rebound and no guarding. No hernia.   Mild diffuse tenderness to palpation   Genitourinary:   Genitourinary Comments: Rectal exam deferred   Musculoskeletal: Normal range of motion. She exhibits no edema or tenderness.   No atrophy of upper or lower extremities.  Normal digits and nails of both hands.   Lymphadenopathy:     She has no cervical adenopathy.   Neurological: She is alert and oriented to person, place, and time. She displays no atrophy. Coordination normal.   Skin: Skin is warm and dry. No rash noted. She is not diaphoretic. No erythema.   Psychiatric: She has a normal mood and affect. Her behavior is normal. Judgment and thought content normal.   Vitals reviewed.      WBC   Date Value Ref Range Status   11/08/2016 7.03 4.50 - 10.70 10*3/mm3 Final     RBC   Date Value Ref Range Status   11/08/2016 5.28 (H) 3.90 - 5.20 10*6/mm3 Final     Hemoglobin   Date Value Ref Range Status   11/08/2016 13.3 11.9 - 15.5 g/dL Final     Hematocrit   Date Value Ref Range Status   11/08/2016 42.8 35.6 - 45.5 % Final     MCV   Date Value Ref Range Status   11/08/2016 81.1 80.5 - 98.2 fL Final     MCH   Date Value Ref Range Status   11/08/2016 25.2 (L) 26.9 - 32.0 pg Final     MCHC   Date Value Ref Range Status   11/08/2016 31.1 (L) 32.4 - 36.3 g/dL Final     RDW   Date Value Ref Range Status   11/08/2016 16.7 (H) 11.7 - 13.0 % Final     RDW-SD   Date Value Ref Range Status   11/08/2016 50.1 37.0 - 54.0 fl Final      MPV   Date Value Ref Range Status   11/08/2016 11.8 6.0 - 12.0 fL Final     Platelets   Date Value Ref Range Status   11/08/2016 286 140 - 500 10*3/mm3 Final     Neutrophil %   Date Value Ref Range Status   11/08/2016 57.0 42.7 - 76.0 % Final     Lymphocyte %   Date Value Ref Range Status   11/08/2016 35.0 19.6 - 45.3 % Final     Monocyte %   Date Value Ref Range Status   11/08/2016 2.7 (L) 5.0 - 12.0 % Final     Eosinophil %   Date Value Ref Range Status   11/08/2016 4.7 0.3 - 6.2 % Final     Basophil %   Date Value Ref Range Status   11/08/2016 0.3 0.0 - 1.5 % Final     Immature Grans %   Date Value Ref Range Status   11/08/2016 0.3 0.0 - 0.5 % Final     Neutrophils, Absolute   Date Value Ref Range Status   11/08/2016 4.01 1.90 - 8.10 10*3/mm3 Final     Lymphocytes, Absolute   Date Value Ref Range Status   11/08/2016 2.46 0.90 - 4.80 10*3/mm3 Final     Monocytes, Absolute   Date Value Ref Range Status   11/08/2016 0.19 (L) 0.20 - 1.20 10*3/mm3 Final     Eosinophils, Absolute   Date Value Ref Range Status   11/08/2016 0.33 0.00 - 0.70 10*3/mm3 Final     Basophils, Absolute   Date Value Ref Range Status   11/08/2016 0.02 0.00 - 0.20 10*3/mm3 Final     Immature Grans, Absolute   Date Value Ref Range Status   11/08/2016 0.02 0.00 - 0.03 10*3/mm3 Final     nRBC   Date Value Ref Range Status   11/08/2016 0.0 0.0 - 0.0 /100 WBC Final       Lab Results   Component Value Date    GLUCOSE 90 11/08/2016    BUN 17 11/08/2016    CREATININE 0.78 11/08/2016    EGFRIFAFRI 97 11/08/2016    BCR 21.8 11/08/2016    CO2 27.7 11/08/2016    CALCIUM 10.1 11/08/2016    ALBUMIN 4.20 11/08/2016    LABIL2 1.3 11/08/2016    AST 12 11/08/2016    ALT 12 11/08/2016         Imaging Results (last 7 days)     ** No results found for the last 168 hours. **            Assessment/Plan    Globus sensation: worsened after improvement with nexium    GERD: stable symptoms    H pylori infection: breath test in November was negative but now she tells me  that she was taking nexium BID when she had the test    Plan  -Pepcid BID  -no ppi's for 2 weeks  -repeat H pylori breath test in 2 weeks as there is a chance that the November test was a false negative    Tika was seen today for heartburn.    Diagnoses and all orders for this visit:    Bacterial infection due to Helicobacter pylori  -     H. Pylori Breath Test - Breath, Lung; Future    Globus sensation  -     famotidine (PEPCID) 20 MG tablet; Take 1 tablet by mouth 2 (Two) Times a Day Before Meals.  -     H. Pylori Breath Test - Breath, Lung; Future    Gastroesophageal reflux disease without esophagitis  -     famotidine (PEPCID) 20 MG tablet; Take 1 tablet by mouth 2 (Two) Times a Day Before Meals.  -     H. Pylori Breath Test - Breath, Lung; Future        Dictated utilizing Dragon dictation

## 2018-03-04 LAB — UREA BREATH TEST QL: NEGATIVE

## 2018-03-07 ENCOUNTER — TELEPHONE (OUTPATIENT)
Dept: GASTROENTEROLOGY | Facility: CLINIC | Age: 48
End: 2018-03-07

## 2018-03-07 DIAGNOSIS — R10.11 RUQ PAIN: Primary | ICD-10-CM

## 2018-03-07 NOTE — TELEPHONE ENCOUNTER
"Call to pt.  Advise per DR Ruano that negative for bacterial infection.    Pt verb understanding.  Reports persistent, intermittent RUQ pain.  Ranks 6 at worst on pain scale.  Reports \"constant acid reflux\" and belching.  Asking if may have GB evaluated.      Question to Dr Ruano.  "

## 2018-03-07 NOTE — TELEPHONE ENCOUNTER
----- Message from Shadia Ruano MD sent at 3/5/2018 12:36 PM EST -----  Her testing was negative for bacterial infection

## 2018-03-07 NOTE — TELEPHONE ENCOUNTER
----- Message from Malka Mabry sent at 3/7/2018  3:17 PM EST -----  Regarding: LABS   Contact: [phone]  PT CALLED FOR LABS REPORT

## 2018-03-12 NOTE — TELEPHONE ENCOUNTER
Call to pt.  Advise per Dr Ruano that [facility] has been ordered.  [facility] will contact to arrange.  Pt verb undestanding.

## 2018-03-13 ENCOUNTER — TELEPHONE (OUTPATIENT)
Dept: GASTROENTEROLOGY | Facility: CLINIC | Age: 48
End: 2018-03-13

## 2018-03-13 RX ORDER — ESOMEPRAZOLE MAGNESIUM 40 MG/1
40 CAPSULE, DELAYED RELEASE ORAL
Qty: 30 CAPSULE | Refills: 2 | Status: SHIPPED | OUTPATIENT
Start: 2018-03-13 | End: 2018-06-07 | Stop reason: SDUPTHER

## 2018-03-13 NOTE — TELEPHONE ENCOUNTER
Called pt and pt reports that the pepcid she is taking is not helping and her reflux is back. She states she went off of her nexium 40mg po daily to have her breath test done.  Pt is asking if she can have a refill on her Nexium 40mg po daily.   Advised would send message to Dr Ruano.

## 2018-03-13 NOTE — TELEPHONE ENCOUNTER
----- Message from Erin Mcintosh sent at 3/13/2018  3:36 PM EDT -----  Regarding: PT CALLED - REQUESTING REFILL ON NEXIUM 40MG QD  Contact: [phone]  PT STATED THE OTHER REFILL IS NOT WORKING. [facility].

## 2018-03-15 ENCOUNTER — APPOINTMENT (OUTPATIENT)
Dept: ULTRASOUND IMAGING | Facility: HOSPITAL | Age: 48
End: 2018-03-15
Attending: INTERNAL MEDICINE

## 2018-03-19 ENCOUNTER — TELEPHONE (OUTPATIENT)
Dept: GASTROENTEROLOGY | Facility: CLINIC | Age: 48
End: 2018-03-19

## 2018-03-19 NOTE — TELEPHONE ENCOUNTER
Called pt and advised per Dr Ruano that her testing was negative for bacterial infection. Pt verb understanding

## 2018-03-23 ENCOUNTER — HOSPITAL ENCOUNTER (OUTPATIENT)
Dept: ULTRASOUND IMAGING | Facility: HOSPITAL | Age: 48
Discharge: HOME | End: 2018-03-23
Attending: INTERNAL MEDICINE | Admitting: INTERNAL MEDICINE

## 2018-03-23 DIAGNOSIS — R10.11 RUQ PAIN: ICD-10-CM

## 2018-03-23 PROCEDURE — 76705 ECHO EXAM OF ABDOMEN: CPT

## 2018-03-24 NOTE — PROGRESS NOTES
Her gallbladder ultrasound shows a normal appearing gallbladder, liver, as well as bile ducts.  Her right kidney appears normal as well.

## 2018-04-06 ENCOUNTER — TELEPHONE (OUTPATIENT)
Dept: GASTROENTEROLOGY | Facility: CLINIC | Age: 48
End: 2018-04-06

## 2018-04-06 RX ORDER — HYOSCYAMINE SULFATE 0.125 MG
0.12 TABLET ORAL EVERY 6 HOURS PRN
Qty: 90 TABLET | Refills: 1 | Status: SHIPPED | OUTPATIENT
Start: 2018-04-06 | End: 2018-07-16

## 2018-04-06 NOTE — TELEPHONE ENCOUNTER
Call to pt.  Advise per Dr Ruano that GB US shows a normal appearing GB, liver, as well as bile ducts.  R kidney appears normal as well.    Pt verb understanding and asking why has persistent daily RUQ pain.  Occurs after eating and lasts about 10 min.  Ranks at 7 on pain scale.      Update to Dr Ruano.

## 2018-04-06 NOTE — TELEPHONE ENCOUNTER
----- Message from Shadia Ruano MD sent at 3/24/2018 12:42 PM EDT -----  Her gallbladder ultrasound shows a normal appearing gallbladder, liver, as well as bile ducts.  Her right kidney appears normal as well.

## 2018-04-06 NOTE — TELEPHONE ENCOUNTER
I think she is feeling the normal digestive process, which some people feel as painful.  I have ordered her some hyoscyamine to take every 6 hours as needed.  This is an antispasm medication that may help with her pain.

## 2018-04-09 NOTE — TELEPHONE ENCOUNTER
Call to pt.  Advise per Dr Ruano that think that feeling normal digestive process, which some people feel as painful.  Hyoscyamine has been ordered to take every 6 hours as needed.  This is an antispasm med that may help pain.    Pt verb understanding and requesting refill of Nexium.  Advise pt that Nexium ordered on 3/13 had 2 refills.  Pt verb understanding but states that bottle she has shows no refills.    Call to [facility] @ [phone] and spoke with Pharmacist.  2 refills remaining.    VM to pt with request to contact office.

## 2018-06-12 RX ORDER — ESOMEPRAZOLE MAGNESIUM 40 MG/1
40 CAPSULE, DELAYED RELEASE ORAL
Qty: 30 CAPSULE | Refills: 2 | Status: SHIPPED | OUTPATIENT
Start: 2018-06-12

## 2018-07-16 ENCOUNTER — OFFICE VISIT (OUTPATIENT)
Dept: GASTROENTEROLOGY | Facility: CLINIC | Age: 48
End: 2018-07-16

## 2018-07-16 VITALS
SYSTOLIC BLOOD PRESSURE: 122 MMHG | HEIGHT: 63 IN | WEIGHT: 171 LBS | BODY MASS INDEX: 30.3 KG/M2 | TEMPERATURE: 97.9 F | DIASTOLIC BLOOD PRESSURE: 84 MMHG

## 2018-07-16 DIAGNOSIS — R09.89 GLOBUS SENSATION: ICD-10-CM

## 2018-07-16 DIAGNOSIS — A04.8 BACTERIAL INFECTION DUE TO HELICOBACTER PYLORI: Primary | ICD-10-CM

## 2018-07-16 DIAGNOSIS — R10.11 RUQ PAIN: ICD-10-CM

## 2018-07-16 DIAGNOSIS — K21.9 GASTROESOPHAGEAL REFLUX DISEASE WITHOUT ESOPHAGITIS: ICD-10-CM

## 2018-07-16 PROCEDURE — 99214 OFFICE O/P EST MOD 30 MIN: CPT | Performed by: INTERNAL MEDICINE

## 2018-07-16 RX ORDER — GABAPENTIN 300 MG/1
300 CAPSULE ORAL 3 TIMES DAILY
COMMUNITY

## 2018-07-16 RX ORDER — NAPROXEN 500 MG/1
500 TABLET ORAL
COMMUNITY
Start: 2018-01-08

## 2018-07-16 RX ORDER — TRAMADOL HYDROCHLORIDE 50 MG/1
TABLET ORAL
Refills: 0 | COMMUNITY
Start: 2018-06-07

## 2018-07-16 RX ORDER — SUCRALFATE ORAL 1 G/10ML
1 SUSPENSION ORAL EVERY 6 HOURS PRN
Qty: 420 ML | Refills: 2 | Status: SHIPPED | OUTPATIENT
Start: 2018-07-16

## 2018-07-16 RX ORDER — DULOXETIN HYDROCHLORIDE 20 MG/1
CAPSULE, DELAYED RELEASE ORAL
COMMUNITY
Start: 2018-06-25

## 2018-07-16 NOTE — PATIENT INSTRUCTIONS
Labs today    Continue nexium    For any additional questions, concerns or changes to your condition after today's office visit please contact the office at [phone].

## 2018-07-16 NOTE — PROGRESS NOTES
Chief Complaint   Patient presents with   • Follow-up     Subjective     HPI  Tika Rodriguez is a 48 y.o. female who presents for follow up of GERD,  H pylori infection s/p treatment with cure confirmed by H pylori breath test, and RUQ pain.      She is taking nexium every morning.  Reports symptoms are better.  Denies any further issues with globus or GERD.  Occasionally uses carafate.  She only intermittently gets right upper quadrant pain.  Her March right upper quadrant ultrasound was normal.    She has gained some weight over the past year.  Not avoiding any foods.  Gets a little nauseated with cymbalta-- takes it on a an empty stomach.    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  No changes in her bowel habits.  No vomiting.        Past Medical History:   Diagnosis Date   • Bulging of cervical intervertebral disc    • GERD (gastroesophageal reflux disease)    • Hypertension    • Neck pain    • Obesity        Social History     Social History   • Marital status:      Social History Main Topics   • Smoking status: Never Smoker   • Smokeless tobacco: Never Used   • Alcohol use No   • Drug use: No     Other Topics Concern   • Not on file         Current Outpatient Prescriptions:   •  DULoxetine (CYMBALTA) 20 MG capsule, , Disp: , Rfl:   •  esomeprazole (nexIUM) 40 MG capsule, take 1 capsule by mouth every morning before BREAKFAST, Disp: 30 capsule, Rfl: 2  •  gabapentin (NEURONTIN) 300 MG capsule, Take 300 mg by mouth 3 (Three) Times a Day., Disp: , Rfl:   •  hydrochlorothiazide (HYDRODIURIL) 12.5 MG tablet, Take 12.5 mg by mouth 2 (Two) Times a Day., Disp: , Rfl:   •  methocarbamol (ROBAXIN) 500 MG tablet, TK 1 T PO  QID PRF MUSCLE SPASMS, Disp: , Rfl: 0  •  naproxen (NAPROSYN) 500 MG tablet, Take 500 mg by mouth., Disp: , Rfl:   •  phentermine 37.5 MG capsule, Take 37.5 mg by mouth Every Morning., Disp: , Rfl:   •  traMADol (ULTRAM) 50 MG tablet, , Disp: , Rfl: 0  •  sucralfate (CARAFATE) 1 GM/10ML suspension,  Take 10 mL by mouth Every 6 (Six) Hours As Needed (heartburn, reflux)., Disp: 420 mL, Rfl: 2    Review of Systems   Constitutional: Negative for activity change, appetite change, chills and fever.   HENT: Negative for trouble swallowing.    Respiratory: Negative.    Cardiovascular: Negative.  Negative for chest pain.   Gastrointestinal: Negative for abdominal distention, abdominal pain, anal bleeding, constipation, diarrhea, nausea and vomiting.   Genitourinary: Negative for dysuria, frequency and hematuria.       Objective   Vitals:    07/16/18 0831   BP: 122/84   Temp: 97.9 °F (36.6 °C)     1    07/16/18  0831   Weight: 77.6 kg (171 lb)     Body mass index is 30.3 kg/m².      Physical Exam   Constitutional: She is oriented to person, place, and time. She appears well-developed and well-nourished. No distress.   HENT:   Head: Normocephalic and atraumatic.   Right Ear: External ear normal.   Left Ear: External ear normal.   Nose: Nose normal.   Mouth/Throat: Oropharynx is clear and moist.   Eyes: Conjunctivae and EOM are normal. Right eye exhibits no discharge. Left eye exhibits no discharge. No scleral icterus.   Neck: Normal range of motion. Neck supple. No thyromegaly present.   No supraclavicular adenopathy   Cardiovascular: Normal rate, regular rhythm, normal heart sounds and intact distal pulses.  Exam reveals no gallop.    No murmur heard.  No lower extremity edema   Pulmonary/Chest: Effort normal and breath sounds normal. No respiratory distress. She has no wheezes.   Abdominal: Soft. Normal appearance and bowel sounds are normal. She exhibits no distension and no mass. There is no hepatosplenomegaly. There is no tenderness. There is no rigidity, no rebound and no guarding. No hernia.   Genitourinary:   Genitourinary Comments: Rectal exam deferred   Musculoskeletal: Normal range of motion. She exhibits no edema or tenderness.   No atrophy of upper or lower extremities.  Normal digits and nails of both hands.    Lymphadenopathy:     She has no cervical adenopathy.   Neurological: She is alert and oriented to person, place, and time. She displays no atrophy. Coordination normal.   Skin: Skin is warm and dry. No rash noted. She is not diaphoretic. No erythema.   Psychiatric: She has a normal mood and affect. Her behavior is normal. Judgment and thought content normal.   Vitals reviewed.      WBC   Date Value Ref Range Status   11/08/2016 7.03 4.50 - 10.70 10*3/mm3 Final     RBC   Date Value Ref Range Status   11/08/2016 5.28 (H) 3.90 - 5.20 10*6/mm3 Final     Hemoglobin   Date Value Ref Range Status   11/08/2016 13.3 11.9 - 15.5 g/dL Final     Hematocrit   Date Value Ref Range Status   11/08/2016 42.8 35.6 - 45.5 % Final     MCV   Date Value Ref Range Status   11/08/2016 81.1 80.5 - 98.2 fL Final     MCH   Date Value Ref Range Status   11/08/2016 25.2 (L) 26.9 - 32.0 pg Final     MCHC   Date Value Ref Range Status   11/08/2016 31.1 (L) 32.4 - 36.3 g/dL Final     RDW   Date Value Ref Range Status   11/08/2016 16.7 (H) 11.7 - 13.0 % Final     RDW-SD   Date Value Ref Range Status   11/08/2016 50.1 37.0 - 54.0 fl Final     MPV   Date Value Ref Range Status   11/08/2016 11.8 6.0 - 12.0 fL Final     Platelets   Date Value Ref Range Status   11/08/2016 286 140 - 500 10*3/mm3 Final     Neutrophil %   Date Value Ref Range Status   11/08/2016 57.0 42.7 - 76.0 % Final     Lymphocyte %   Date Value Ref Range Status   11/08/2016 35.0 19.6 - 45.3 % Final     Monocyte %   Date Value Ref Range Status   11/08/2016 2.7 (L) 5.0 - 12.0 % Final     Eosinophil %   Date Value Ref Range Status   11/08/2016 4.7 0.3 - 6.2 % Final     Basophil %   Date Value Ref Range Status   11/08/2016 0.3 0.0 - 1.5 % Final     Immature Grans %   Date Value Ref Range Status   11/08/2016 0.3 0.0 - 0.5 % Final     Neutrophils, Absolute   Date Value Ref Range Status   11/08/2016 4.01 1.90 - 8.10 10*3/mm3 Final     Lymphocytes, Absolute   Date Value Ref Range Status    11/08/2016 2.46 0.90 - 4.80 10*3/mm3 Final     Monocytes, Absolute   Date Value Ref Range Status   11/08/2016 0.19 (L) 0.20 - 1.20 10*3/mm3 Final     Eosinophils, Absolute   Date Value Ref Range Status   11/08/2016 0.33 0.00 - 0.70 10*3/mm3 Final     Basophils, Absolute   Date Value Ref Range Status   11/08/2016 0.02 0.00 - 0.20 10*3/mm3 Final     Immature Grans, Absolute   Date Value Ref Range Status   11/08/2016 0.02 0.00 - 0.03 10*3/mm3 Final     nRBC   Date Value Ref Range Status   11/08/2016 0.0 0.0 - 0.0 /100 WBC Final       Lab Results   Component Value Date    GLUCOSE 90 11/08/2016    BUN 17 11/08/2016    CREATININE 0.78 11/08/2016    EGFRIFAFRI 97 11/08/2016    BCR 21.8 11/08/2016    CO2 27.7 11/08/2016    CALCIUM 10.1 11/08/2016    ALBUMIN 4.20 11/08/2016    LABIL2 1.3 11/08/2016    AST 12 11/08/2016    ALT 12 11/08/2016         HISTORY: 47-year-old female with a history of persistent abdominal pain  in the right upper quadrant for one year.     FINDINGS: Sonographic evaluation of the gallbladder and selected  structures of the right upper quadrant was performed. The right kidney  has a normal appearance. No abnormality of liver is appreciated. The  gallbladder appears normal. The common bile duct measures 0.4 cm in  diameter. Per the sonographer, the patient was nontender in the right  upper quadrant during the examination. The visualized portion of the  pancreas appears normal.     IMPRESSION:  Negative gallbladder sonogram.     This report was finalized on 3/23/2018 4:57       Assessment/Plan    1. GERD: stable on nexium    2. H pylori: cured after quadruple tx    3. Globus sensation: occurs with #1    4. RUQ pain: intermittent, nl RUQ US, ? Musculoskeletal    Plan  Continue nexium  Carafate prn  CBC, CMP today as none recently, need to monitor while on ppi    Tika was seen today for follow-up.    Diagnoses and all orders for this visit:    Bacterial infection due to Helicobacter  pylori    Gastroesophageal reflux disease without esophagitis  -     Comprehensive Metabolic Panel  -     CBC & Differential    Globus sensation  -     Comprehensive Metabolic Panel  -     CBC & Differential  -     sucralfate (CARAFATE) 1 GM/10ML suspension; Take 10 mL by mouth Every 6 (Six) Hours As Needed (heartburn, reflux).    RUQ pain        Dictated utilizing Dragon dictation

## 2018-07-17 ENCOUNTER — RESULTS ENCOUNTER (OUTPATIENT)
Dept: GASTROENTEROLOGY | Facility: CLINIC | Age: 48
End: 2018-07-17

## 2018-07-17 DIAGNOSIS — D64.9 NORMOCYTIC ANEMIA: ICD-10-CM

## 2018-07-17 DIAGNOSIS — D64.9 NORMOCYTIC ANEMIA: Primary | ICD-10-CM

## 2018-07-17 LAB
ALBUMIN SERPL-MCNC: 4.2 G/DL (ref 3.5–5.5)
ALBUMIN/GLOB SERPL: 1.7 {RATIO} (ref 1.2–2.2)
ALP SERPL-CCNC: 58 IU/L (ref 39–117)
ALT SERPL-CCNC: 20 IU/L (ref 0–32)
AST SERPL-CCNC: 23 IU/L (ref 0–40)
BASOPHILS # BLD AUTO: 0 X10E3/UL (ref 0–0.2)
BASOPHILS NFR BLD AUTO: 0 %
BILIRUB SERPL-MCNC: <0.2 MG/DL (ref 0–1.2)
BUN SERPL-MCNC: 29 MG/DL (ref 6–24)
BUN/CREAT SERPL: 34 (ref 9–23)
CALCIUM SERPL-MCNC: 9.3 MG/DL (ref 8.7–10.2)
CHLORIDE SERPL-SCNC: 108 MMOL/L (ref 96–106)
CO2 SERPL-SCNC: 18 MMOL/L (ref 20–29)
CREAT SERPL-MCNC: 0.85 MG/DL (ref 0.57–1)
EOSINOPHIL # BLD AUTO: 0.3 X10E3/UL (ref 0–0.4)
EOSINOPHIL NFR BLD AUTO: 4 %
ERYTHROCYTE [DISTWIDTH] IN BLOOD BY AUTOMATED COUNT: 15 % (ref 12.3–15.4)
GLOBULIN SER CALC-MCNC: 2.5 G/DL (ref 1.5–4.5)
GLUCOSE SERPL-MCNC: 84 MG/DL (ref 65–99)
HCT VFR BLD AUTO: 34.5 % (ref 34–46.6)
HGB BLD-MCNC: 10.9 G/DL (ref 11.1–15.9)
IMM GRANULOCYTES # BLD: 0 X10E3/UL (ref 0–0.1)
IMM GRANULOCYTES NFR BLD: 0 %
LYMPHOCYTES # BLD AUTO: 2.4 X10E3/UL (ref 0.7–3.1)
LYMPHOCYTES NFR BLD AUTO: 38 %
MCH RBC QN AUTO: 25.8 PG (ref 26.6–33)
MCHC RBC AUTO-ENTMCNC: 31.6 G/DL (ref 31.5–35.7)
MCV RBC AUTO: 82 FL (ref 79–97)
MONOCYTES # BLD AUTO: 0.4 X10E3/UL (ref 0.1–0.9)
MONOCYTES NFR BLD AUTO: 7 %
NEUTROPHILS # BLD AUTO: 3.1 X10E3/UL (ref 1.4–7)
NEUTROPHILS NFR BLD AUTO: 51 %
PLATELET # BLD AUTO: 237 X10E3/UL (ref 150–379)
POTASSIUM SERPL-SCNC: 4.6 MMOL/L (ref 3.5–5.2)
PROT SERPL-MCNC: 6.7 G/DL (ref 6–8.5)
RBC # BLD AUTO: 4.22 X10E6/UL (ref 3.77–5.28)
SODIUM SERPL-SCNC: 142 MMOL/L (ref 134–144)
WBC # BLD AUTO: 6.2 X10E3/UL (ref 3.4–10.8)

## 2018-07-17 NOTE — PROGRESS NOTES
Please let her know that her hemoglobin is lower than it was a year ago.    Can we have her come in sometime in the next week or so to check iron studies and repeat her hemoglobin, thx (orders have been placed)

## 2018-07-18 ENCOUNTER — TELEPHONE (OUTPATIENT)
Dept: GASTROENTEROLOGY | Facility: CLINIC | Age: 48
End: 2018-07-18

## 2018-07-18 NOTE — TELEPHONE ENCOUNTER
Called pt and advised per Dr Ruano that her hgb is lower than it was a year ago.   She would like her to come in sometime next week or so to check iron studies and repeat her hgb.      Pt verb understanding and made lab appt for 07/23/2018 at 10am.

## 2018-07-18 NOTE — TELEPHONE ENCOUNTER
----- Message from Shadia Ruano MD sent at 7/17/2018 10:01 AM EDT -----  Please let her know that her hemoglobin is lower than it was a year ago.    Can we have her come in sometime in the next week or so to check iron studies and repeat her hemoglobin, thx (orders have been placed)

## 2018-07-23 LAB
FERRITIN SERPL-MCNC: 184.9 NG/ML (ref 13–150)
HCT VFR BLD AUTO: 42.6 % (ref 35.6–45.5)
HGB BLD-MCNC: 13.1 G/DL (ref 11.9–15.5)
IRON SATN MFR SERPL: 21 % (ref 20–50)
IRON SERPL-MCNC: 74 MCG/DL (ref 37–145)
TIBC SERPL-MCNC: 346 MCG/DL
UIBC SERPL-MCNC: 272 MCG/DL

## 2018-07-24 ENCOUNTER — TELEPHONE (OUTPATIENT)
Dept: GASTROENTEROLOGY | Facility: CLINIC | Age: 48
End: 2018-07-24

## 2018-07-24 NOTE — TELEPHONE ENCOUNTER
----- Message from Shadia Ruano MD sent at 7/23/2018  5:30 PM EDT -----  Her blood count and iron stores are normal. The labs from a week ago were most likely an error.

## 2018-07-24 NOTE — TELEPHONE ENCOUNTER
Called pt and advised per Dr Ruano that her blood count and iron stores are normal. The labs from a week ago were mostly likely an error. Pt verb understanding.

## 2020-11-09 ENCOUNTER — TELEPHONE (OUTPATIENT)
Dept: GASTROENTEROLOGY | Facility: CLINIC | Age: 50
End: 2020-11-09

## 2020-11-20 ENCOUNTER — TELEPHONE (OUTPATIENT)
Dept: GASTROENTEROLOGY | Facility: CLINIC | Age: 50
End: 2020-11-20

## (undated) DEVICE — THE TORRENT IRRIGATION SCOPE CONNECTOR IS USED WITH THE TORRENT IRRIGATION TUBING TO PROVIDE IRRIGATION FLUIDS SUCH AS STERILE WATER DURING GASTROINTESTINAL ENDOSCOPIC PROCEDURES WHEN USED IN CONJUNCTION WITH AN IRRIGATION PUMP (OR ELECTROSURGICAL UNIT).: Brand: TORRENT

## (undated) DEVICE — BITEBLOCK OMNI BLOC

## (undated) DEVICE — TUBING, SUCTION, 1/4" X 10', STRAIGHT: Brand: MEDLINE

## (undated) DEVICE — CANN NASL CO2 TRULINK W/O2 A/

## (undated) DEVICE — Device: Brand: DEFENDO AIR/WATER/SUCTION AND BIOPSY VALVE

## (undated) DEVICE — FRCP BX RADJAW4 NDL 2.8 240CM LG OG BX40

## (undated) DEVICE — TBG 02 CRUSH RESIST LF CLR 7FT